# Patient Record
Sex: FEMALE | Race: ASIAN | NOT HISPANIC OR LATINO | Employment: FULL TIME | URBAN - METROPOLITAN AREA
[De-identification: names, ages, dates, MRNs, and addresses within clinical notes are randomized per-mention and may not be internally consistent; named-entity substitution may affect disease eponyms.]

---

## 2023-07-25 ENCOUNTER — OFFICE VISIT (OUTPATIENT)
Dept: OBGYN CLINIC | Facility: CLINIC | Age: 38
End: 2023-07-25
Payer: MEDICARE

## 2023-07-25 VITALS
SYSTOLIC BLOOD PRESSURE: 116 MMHG | DIASTOLIC BLOOD PRESSURE: 72 MMHG | BODY MASS INDEX: 32.57 KG/M2 | HEIGHT: 62 IN | WEIGHT: 177 LBS

## 2023-07-25 DIAGNOSIS — Z01.419 WELL WOMAN EXAM WITH ROUTINE GYNECOLOGICAL EXAM: Primary | ICD-10-CM

## 2023-07-25 DIAGNOSIS — Z12.4 SCREENING FOR MALIGNANT NEOPLASM OF CERVIX: ICD-10-CM

## 2023-07-25 DIAGNOSIS — Z11.51 SCREENING FOR HUMAN PAPILLOMAVIRUS (HPV): ICD-10-CM

## 2023-07-25 DIAGNOSIS — R20.0 BILATERAL HAND NUMBNESS: ICD-10-CM

## 2023-07-25 PROCEDURE — G0145 SCR C/V CYTO,THINLAYER,RESCR: HCPCS | Performed by: OBSTETRICS & GYNECOLOGY

## 2023-07-25 PROCEDURE — G0476 HPV COMBO ASSAY CA SCREEN: HCPCS | Performed by: OBSTETRICS & GYNECOLOGY

## 2023-07-25 PROCEDURE — 99385 PREV VISIT NEW AGE 18-39: CPT | Performed by: OBSTETRICS & GYNECOLOGY

## 2023-07-25 NOTE — PROGRESS NOTES
Ramses LeoESSMENT & PLAN: Jose Arevalo is a 41 y.o. [id number] with normal gynecologic exam.    1.  Routine well woman exam done today. 2.   Pap and HPV:Pap with HPV was done today. Current ASCCP Guidelines reviewed. Last Pap  [unfilled] :  no abnormalities. 3.  AUB- may be secondary to nexplanon. Offered to add ocp if irregular pattern continues. Also offered nexplanon removal, which she declines. 4. The patient is sexually active. 5. The following were reviewed in today's visit: breast self exam and family planning. 6. Patient to return to office in 12 months for WWE. 7.  Symptoms c/w b/l carpel tunnel. Rx placed for wrist splints, discussed. Advised follow up with PCP. All questions have been answered to her satisfaction. CC:  Annual Gynecologic Examination    HPI: Jose Arevalo is a 41 y.o. [id number] who presents for annual gynecologic examination. She has the following concerns:  Feeling of numbness in both hands. Does work as a . Pt. Has nexplanon since , reports irregular bleeding. States cycles have been more irregular recently. Hx of c/s x 2. Health Maintenance:    She wears her seatbelt routinely. She does perform irregular monthly self breast exams. She feels safe at home. Past Medical History:   Diagnosis Date   • Irregular menses        Past Surgical History:   Procedure Laterality Date   •  SECTION      2 times       Past OB/Gyn History:  Period Duration (Days): 3-5  Period Pattern: (!) Irregular  Menstrual Flow: Moderate  Menstrual Control: Maxi pad, TamponPatient's last menstrual period was 2023 (exact date). Menstrual History:  OB History        2    Para   2    Term   2            AB        Living   2       SAB        IAB        Ectopic        Multiple        Live Births   2                  Patient's last menstrual period was 2023 (exact date).   Period Duration (Days): 3-5  Period Pattern: (!) Irregular  Menstrual Flow: Moderate  Menstrual Control: Maxi pad, Tampon    History of sexually transmitted infection No  Patient is currently sexually active. heterosexual Birth control: Nexplanon since 2021. Last Pap  [unfilled] :  no abnormalities. Family History   Problem Relation Age of Onset   • Hypertension Mother    • No Known Problems Father    • No Known Problems Sister    • No Known Problems Sister    • No Known Problems Sister    • No Known Problems Brother    • No Known Problems Maternal Grandmother    • No Known Problems Maternal Grandfather    • No Known Problems Paternal Grandmother    • No Known Problems Paternal Grandfather        Social History:  Social History     Socioeconomic History   • Marital status: Single     Spouse name: Not on file   • Number of children: Not on file   • Years of education: Not on file   • Highest education level: Not on file   Occupational History   • Not on file   Tobacco Use   • Smoking status: Never   • Smokeless tobacco: Never   Substance and Sexual Activity   • Alcohol use: Never   • Drug use: Never   • Sexual activity: Yes     Partners: Male     Birth control/protection: Implant   Other Topics Concern   • Not on file   Social History Narrative   • Not on file     Social Determinants of Health     Financial Resource Strain: Not on file   Food Insecurity: Not on file   Transportation Needs: Not on file   Physical Activity: Not on file   Stress: Not on file   Social Connections: Not on file   Intimate Partner Violence: Not on file   Housing Stability: Not on file     Presently lives with partner and kids. Patient is co-habitating. Patient is currently employed   No Known Allergies    Current Outpatient Medications:   •  etonogestrel (NEXPLANON) subdermal implant, 68 mg by Subdermal route once, Disp: , Rfl:     Review of Systems:  Review of Systems   Constitutional: Negative for activity change, chills, fever and unexpected weight change.    HENT: Negative for congestion, ear pain, hearing loss and sore throat. Respiratory: Negative for cough, chest tightness and shortness of breath. Cardiovascular: Negative for chest pain and leg swelling. Gastrointestinal: Negative for abdominal pain, constipation, diarrhea, nausea and vomiting. Genitourinary: Positive for menstrual problem. Negative for difficulty urinating, dysuria, frequency, pelvic pain, vaginal discharge and vaginal pain. Skin: Negative for color change and rash. Neurological: Positive for numbness. Negative for dizziness and headaches. Per HPI    Psychiatric/Behavioral: Negative for agitation and confusion. Physical Exam:  /72 (BP Location: Right arm, Patient Position: Sitting, Cuff Size: Standard)   Ht 5' 2" (1.575 m)   Wt 80.3 kg (177 lb)   LMP 07/22/2023 (Exact Date)   BMI 32.37 kg/m²    Physical Exam  Constitutional:       General: She is not in acute distress. Appearance: Normal appearance. She is obese. Genitourinary:      Vulva, bladder, rectum and urethral meatus normal.      No lesions in the vagina. Right Labia: No rash, tenderness or lesions. Left Labia: No tenderness, lesions or rash. No labial fusion noted. No vaginal discharge or tenderness. No vaginal prolapse present. No vaginal atrophy present. Right Adnexa: not tender, not full and no mass present. Left Adnexa: not tender, not full and no mass present. No cervical motion tenderness or friability. Uterus is not enlarged or prolapsed. Breasts:     Breasts are soft. Right: No inverted nipple, mass, nipple discharge or skin change. Left: No inverted nipple, mass, nipple discharge or skin change. HENT:      Head: Normocephalic and atraumatic. Nose: Nose normal.   Eyes:      Conjunctiva/sclera: Conjunctivae normal.      Pupils: Pupils are equal, round, and reactive to light. Cardiovascular:      Rate and Rhythm: Normal rate and regular rhythm.       Pulses: Normal pulses. Heart sounds: Normal heart sounds. Pulmonary:      Effort: Pulmonary effort is normal. No respiratory distress. Breath sounds: Normal breath sounds. No wheezing. Abdominal:      General: Abdomen is flat. There is no distension. Palpations: Abdomen is soft. Tenderness: There is no abdominal tenderness. There is no guarding. Musculoskeletal:         General: Normal range of motion. Cervical back: Normal range of motion and neck supple. Neurological:      General: No focal deficit present. Mental Status: She is alert and oriented to person, place, and time. Mental status is at baseline. Skin:     General: Skin is warm and dry. Psychiatric:         Mood and Affect: Mood normal.         Behavior: Behavior normal.         Thought Content: Thought content normal.         Judgment: Judgment normal.   Vitals and nursing note reviewed.

## 2023-07-27 LAB
HPV HR 12 DNA CVX QL NAA+PROBE: NEGATIVE
HPV16 DNA CVX QL NAA+PROBE: NEGATIVE
HPV18 DNA CVX QL NAA+PROBE: NEGATIVE

## 2023-08-01 LAB
LAB AP GYN PRIMARY INTERPRETATION: NORMAL
Lab: NORMAL
PATH INTERP SPEC-IMP: NORMAL

## 2024-12-14 ENCOUNTER — OFFICE VISIT (OUTPATIENT)
Dept: URGENT CARE | Facility: CLINIC | Age: 39
End: 2024-12-14
Payer: COMMERCIAL

## 2024-12-14 VITALS
DIASTOLIC BLOOD PRESSURE: 68 MMHG | BODY MASS INDEX: 32.94 KG/M2 | HEIGHT: 62 IN | SYSTOLIC BLOOD PRESSURE: 143 MMHG | RESPIRATION RATE: 14 BRPM | TEMPERATURE: 98.2 F | WEIGHT: 179 LBS | HEART RATE: 67 BPM | OXYGEN SATURATION: 99 %

## 2024-12-14 DIAGNOSIS — J20.9 ACUTE BRONCHITIS, UNSPECIFIED ORGANISM: Primary | ICD-10-CM

## 2024-12-14 PROCEDURE — 99213 OFFICE O/P EST LOW 20 MIN: CPT | Performed by: NURSE PRACTITIONER

## 2024-12-14 RX ORDER — AZITHROMYCIN 250 MG/1
TABLET, FILM COATED ORAL
Qty: 6 TABLET | Refills: 0 | Status: SHIPPED | OUTPATIENT
Start: 2024-12-14 | End: 2024-12-19

## 2024-12-14 RX ORDER — BENZONATATE 200 MG/1
200 CAPSULE ORAL 3 TIMES DAILY PRN
Qty: 20 CAPSULE | Refills: 0 | Status: SHIPPED | OUTPATIENT
Start: 2024-12-14

## 2024-12-14 NOTE — PROGRESS NOTES
[facility]        NAME: Hank Mathews is a 39 y.o. female  : 1985    MRN: [id number]  DATE: 2024  TIME: 8:48 AM    Assessment and Plan   Acute bronchitis, unspecified organism [J20.9]  1. Acute bronchitis, unspecified organism  azithromycin (ZITHROMAX) 250 mg tablet    benzonatate (TESSALON) 200 MG capsule            Patient Instructions     Patient Instructions   --Rest, drink plenty of fluids  --Fill and start antibiotic if no improvement/worsening cough over the next 3-5 days.   --Consider vitamin C, zinc, quercetin, and vitamin D to help strengthen your immune system  --For cough, you can take an OTC expectorant such as plain Robitussion or Mucinex (active ingredient guaifenesin).  A spoonful of honey at bedtime may also be helpful, as may a prescription cough medicine.  Also recommended is the use of a cool mist humidifier (with or without Vicks) in the bedroom at night.   --For sore throat, you can take OTC lozenges, use warm gargles (salt water or apple cider vinegar and honey), herbal teas, or an OTC throat spray (Chloraseptic).  --For nasal/sinus congestion, helpful measures include steam, warm compresses, an OTC saline nasal spray or Neti pot, or an OTC decongestant (such as Sudafed).  The decongestant should be avoided, however, if you are under 6 years of age, or have a history of high blood pressure or heart disease.  In addition, an OTC nasal steroid (Flonase, Nasocort) or nasal decongestant (Afrin, Calderon-synephrine) may be taken.  The nasal steroid should be used at bedtime, after the saline nasal spray. The nasal decongestant should not be taken more than 3 days consecutively in order to prevent rebound congestion.   --For nasal drainage, postnasal drip, sneezing and itching, an OTC antihistamine (Allegra, Benadryl, etc) can be taken.   --You can take Tylenol or Motrin/Advil as needed for fever, headache, body aches. Motrin/Advil should be avoided, however, if you have  a history of heart disease, bleeding ulcers, or if you take blood thinners.   --You should contact your primary care provider and/or go to the ER if your symptoms are not improved or get worse over the next 7 days.  This includes new onset fever, localized ear pain, sinus pain, as well as worsening cough, chest pain, shortness of breath, or significant weakness/fatigue.          If tests have been performed at [facility], our office will contact you with results if changes need to be made to the care plan discussed with you at the visit.  [facility]    Chief Complaint     Chief Complaint   Patient presents with    Cough     URI s/s with cough and congestion x 1 week.          History of Present Illness       Here with complaints of cough productive of yellow sputum, rhinorrhea x 2 weeks.   Some headaches. No unusual body aches. No sore throat.   No post nasal drip.    Cough ongoing--no better.   No associated wheezing, CP, dyspnea.   No GI complaints.    Taking Dayquil.    No COVID concerns.    Denies pregnancy/breastfeeding.          Review of Systems   Review of Systems   Constitutional:  Negative for fever.   HENT:  Positive for rhinorrhea. Negative for ear pain, sinus pain and sore throat.    Respiratory:  Positive for cough. Negative for shortness of breath and wheezing.    Cardiovascular:  Negative for chest pain.   Gastrointestinal:  Negative for abdominal pain and vomiting.   Musculoskeletal:  Negative for myalgias.   Neurological:  Positive for headaches.         Current Medications       Current Outpatient Medications:     azithromycin (ZITHROMAX) 250 mg tablet, Take 2 tablets today then 1 tablet daily x 4 days, Disp: 6 tablet, Rfl: 0    benzonatate (TESSALON) 200 MG capsule, Take 1 capsule (200 mg total) by mouth 3 (three) times a day as needed for cough, Disp: 20 capsule, Rfl: 0    etonogestrel (NEXPLANON) subdermal implant, 68 mg by Subdermal route once, Disp:  ", Rfl:     Current Allergies     Allergies as of 2024    (No Known Allergies)            The following portions of the patient's history were reviewed and updated as appropriate: allergies, current medications, past family history, past medical history, past social history, past surgical history and problem list.     Past Medical History:   Diagnosis Date    Irregular menses        Past Surgical History:   Procedure Laterality Date     SECTION      2 times       Family History   Problem Relation Age of Onset    Hypertension Mother     No Known Problems Father     No Known Problems Sister     No Known Problems Sister     No Known Problems Sister     No Known Problems Brother     No Known Problems Maternal Grandmother     No Known Problems Maternal Grandfather     No Known Problems Paternal Grandmother     No Known Problems Paternal Grandfather          Medications have been verified.        Objective   /68   Pulse 67   Temp 98.2 °F (36.8 °C)   Resp 14   Ht 5' 2\" (1.575 m)   Wt 81.2 kg (179 lb)   SpO2 99%   BMI 32.74 kg/m²   No LMP recorded.       Physical Exam     Physical Exam  Constitutional:       General: She is not in acute distress.     Appearance: Normal appearance. She is well-developed. She is not ill-appearing, toxic-appearing or diaphoretic.   HENT:      Head: Normocephalic.      Right Ear: Tympanic membrane, ear canal and external ear normal.      Left Ear: Tympanic membrane, ear canal and external ear normal.      Nose: Congestion and rhinorrhea present.      Comments: No sinus tenderness.      Mouth/Throat:      Pharynx: No oropharyngeal exudate or posterior oropharyngeal erythema.   Eyes:      General:         Right eye: No discharge.         Left eye: No discharge.   Cardiovascular:      Rate and Rhythm: Normal rate and regular rhythm.      Heart sounds: Normal heart sounds. No murmur heard.  Pulmonary:      Effort: Pulmonary effort is normal. No respiratory distress.      " Breath sounds: Normal breath sounds. No stridor. No wheezing, rhonchi or rales.      Comments: Breathing easy.  RR=18. No cough noted at this time.    Chest:      Chest wall: No tenderness.   Abdominal:      Tenderness: There is no abdominal tenderness.   Musculoskeletal:      Cervical back: Neck supple.   Lymphadenopathy:      Cervical: No cervical adenopathy.   Skin:     General: Skin is warm and dry.   Neurological:      Mental Status: She is alert and oriented to person, place, and time.      Deep Tendon Reflexes: Reflexes are normal and symmetric.   Psychiatric:         Mood and Affect: Mood normal.

## 2024-12-14 NOTE — PATIENT INSTRUCTIONS
--Rest, drink plenty of fluids  --Fill and start antibiotic if no improvement/worsening cough over the next 3-5 days.   --Consider vitamin C, zinc, quercetin, and vitamin D to help strengthen your immune system  --For cough, you can take an OTC expectorant such as plain Robitussion or Mucinex (active ingredient guaifenesin).  A spoonful of honey at bedtime may also be helpful, as may a prescription cough medicine.  Also recommended is the use of a cool mist humidifier (with or without Vicks) in the bedroom at night.   --For sore throat, you can take OTC lozenges, use warm gargles (salt water or apple cider vinegar and honey), herbal teas, or an OTC throat spray (Chloraseptic).  --For nasal/sinus congestion, helpful measures include steam, warm compresses, an OTC saline nasal spray or Neti pot, or an OTC decongestant (such as Sudafed).  The decongestant should be avoided, however, if you are under 6 years of age, or have a history of high blood pressure or heart disease.  In addition, an OTC nasal steroid (Flonase, Nasocort) or nasal decongestant (Afrin, Calderon-synephrine) may be taken.  The nasal steroid should be used at bedtime, after the saline nasal spray. The nasal decongestant should not be taken more than 3 days consecutively in order to prevent rebound congestion.   --For nasal drainage, postnasal drip, sneezing and itching, an OTC antihistamine (Allegra, Benadryl, etc) can be taken.   --You can take Tylenol or Motrin/Advil as needed for fever, headache, body aches. Motrin/Advil should be avoided, however, if you have a history of heart disease, bleeding ulcers, or if you take blood thinners.   --You should contact your primary care provider and/or go to the ER if your symptoms are not improved or get worse over the next 7 days.  This includes new onset fever, localized ear pain, sinus pain, as well as worsening cough, chest pain, shortness of breath, or significant weakness/fatigue.

## 2025-05-22 ENCOUNTER — OFFICE VISIT (OUTPATIENT)
Dept: URGENT CARE | Facility: CLINIC | Age: 40
End: 2025-05-22
Payer: COMMERCIAL

## 2025-05-22 VITALS
WEIGHT: 179 LBS | BODY MASS INDEX: 32.94 KG/M2 | HEIGHT: 62 IN | DIASTOLIC BLOOD PRESSURE: 82 MMHG | OXYGEN SATURATION: 99 % | SYSTOLIC BLOOD PRESSURE: 171 MMHG | RESPIRATION RATE: 16 BRPM | HEART RATE: 57 BPM | TEMPERATURE: 97.5 F

## 2025-05-22 DIAGNOSIS — L24.7 IRRITANT CONTACT DERMATITIS DUE TO PLANTS, EXCEPT FOOD: Primary | ICD-10-CM

## 2025-05-22 PROCEDURE — 99213 OFFICE O/P EST LOW 20 MIN: CPT | Performed by: NURSE PRACTITIONER

## 2025-05-22 RX ORDER — PREDNISONE 20 MG/1
TABLET ORAL
Qty: 15 TABLET | Refills: 0 | Status: SHIPPED | OUTPATIENT
Start: 2025-05-22 | End: 2025-06-03

## 2025-05-22 NOTE — PROGRESS NOTES
Name: Hank Mathews      : 1985      MRN: [id number]  Encounter Provider: ALFRED Reese  Encounter Date: 2025   Encounter department: [facility]  :  Assessment & Plan  Irritant contact dermatitis due to plants, except food  Begin oral steroids as prescribed  May use an antihistamine cream such benadryl cream to help with the itching  Avoid known allergens  Applying a cool, wet cloth to affected area for 15-20 minutes several times a day may help reduce itching and inflammation  Avoid scratching area to prevent infection  Wash and dry hands thoroughly to prevent spread    If symptoms worsen or signs of infection develop such as swelling, warmth, redness, red streaks, purulent drainage or fever, go to the emergency dept for further evaluation   Orders:    predniSONE 20 mg tablet; Take 2 tablets (40 mg total) by mouth daily for 3 days, THEN 1.5 tablets (30 mg total) daily for 3 days, THEN 1 tablet (20 mg total) daily for 3 days, THEN 0.5 tablets (10 mg total) daily for 3 days.        History of Present Illness   Rash  Pertinent negatives include no cough, fever, shortness of breath, sore throat or vomiting.     Hank Mathews is a 39 y.o. female who presents for evaluation of a rash.  Patient reports she was out working in some brush on Saturday and noticed a rash on her right forearm 2 days ago which then spread to her right abdomen and left neck.  She states she has been putting hydrocortisone cream on the rash with no improvement she has also been taking p.o. Benadryl for symptoms.  She states the rash is itchy and seems to be spreading.  She denies fever.  She denies difficulty swallowing, shortness of breath, or swelling of her oral mucosa.      Review of Systems   Constitutional:  Negative for chills and fever.   HENT:  Negative for ear pain and sore throat.    Eyes:  Negative for pain and visual disturbance.   Respiratory:  Negative for cough and shortness of  "breath.    Cardiovascular:  Negative for chest pain and palpitations.   Gastrointestinal:  Negative for abdominal pain and vomiting.   Genitourinary:  Negative for dysuria and hematuria.   Musculoskeletal:  Negative for arthralgias and back pain.   Skin:  Positive for rash. Negative for color change.        Right arm, right abdomen and left neck   Neurological:  Negative for seizures and syncope.   All other systems reviewed and are negative.         Objective   BP (!) 171/82   Pulse 57   Temp 97.5 °F (36.4 °C)   Resp 16   Ht 5' 2\" (1.575 m)   Wt 81.2 kg (179 lb)   SpO2 99%   BMI 32.74 kg/m²      Physical Exam  Vitals and nursing note reviewed.   Constitutional:       General: She is not in acute distress.     Appearance: She is well-developed.   HENT:      Head: Normocephalic and atraumatic.      Nose: Nose normal.      Mouth/Throat:      Mouth: Mucous membranes are moist.      Pharynx: Oropharynx is clear.     Eyes:      Conjunctiva/sclera: Conjunctivae normal.       Cardiovascular:      Rate and Rhythm: Normal rate and regular rhythm.      Heart sounds: No murmur heard.  Pulmonary:      Effort: Pulmonary effort is normal. No respiratory distress.      Breath sounds: Normal breath sounds.   Abdominal:      Palpations: Abdomen is soft.      Tenderness: There is no abdominal tenderness.     Musculoskeletal:         General: No swelling.      Cervical back: Neck supple.     Skin:     General: Skin is warm and dry.      Capillary Refill: Capillary refill takes less than 2 seconds.      Findings: Rash present.          Neurological:      Mental Status: She is alert.     Psychiatric:         Mood and Affect: Mood normal.           "

## 2025-05-22 NOTE — PATIENT INSTRUCTIONS
Begin oral steroids as prescribed  May use an antihistamine cream such benadryl cream to help with the itching  Avoid known allergens  Applying a cool, wet cloth to affected area for 15-20 minutes several times a day may help reduce itching and inflammation  Avoid scratching area to prevent infection  Wash and dry hands thoroughly to prevent spread    If symptoms worsen or signs of infection develop such as swelling, warmth, redness, red streaks, purulent drainage or fever, go to the emergency dept for further evaluation

## 2025-06-05 ENCOUNTER — OFFICE VISIT (OUTPATIENT)
Dept: URGENT CARE | Facility: CLINIC | Age: 40
End: 2025-06-05
Payer: COMMERCIAL

## 2025-06-05 VITALS
HEART RATE: 77 BPM | RESPIRATION RATE: 16 BRPM | OXYGEN SATURATION: 98 % | TEMPERATURE: 98 F | DIASTOLIC BLOOD PRESSURE: 80 MMHG | SYSTOLIC BLOOD PRESSURE: 127 MMHG

## 2025-06-05 DIAGNOSIS — L23.9 ALLERGIC CONTACT DERMATITIS, UNSPECIFIED TRIGGER: Primary | ICD-10-CM

## 2025-06-05 PROCEDURE — 96372 THER/PROPH/DIAG INJ SC/IM: CPT | Performed by: NURSE PRACTITIONER

## 2025-06-05 PROCEDURE — 99213 OFFICE O/P EST LOW 20 MIN: CPT | Performed by: NURSE PRACTITIONER

## 2025-06-05 RX ORDER — FLUOCINONIDE 0.5 MG/G
CREAM TOPICAL 2 TIMES DAILY
Qty: 60 G | Refills: 0 | Status: SHIPPED | OUTPATIENT
Start: 2025-06-05

## 2025-06-05 RX ORDER — METHYLPREDNISOLONE ACETATE 40 MG/ML
80 INJECTION, SUSPENSION INTRA-ARTICULAR; INTRALESIONAL; INTRAMUSCULAR; SOFT TISSUE ONCE
Status: COMPLETED | OUTPATIENT
Start: 2025-06-05 | End: 2025-06-05

## 2025-06-05 RX ORDER — PREDNISONE 10 MG/1
TABLET ORAL
Qty: 36 TABLET | Refills: 0 | Status: SHIPPED | OUTPATIENT
Start: 2025-06-05

## 2025-06-05 RX ADMIN — METHYLPREDNISOLONE ACETATE 80 MG: 40 INJECTION, SUSPENSION INTRA-ARTICULAR; INTRALESIONAL; INTRAMUSCULAR; SOFT TISSUE at 18:11

## 2025-06-05 NOTE — PROGRESS NOTES
[facility]        NAME: Hank Mathews is a 40 y.o. female  : 1985    MRN: [id number]  DATE: 2025  TIME: 6:09 PM    Assessment and Plan   Allergic contact dermatitis, unspecified trigger [L23.9]  1. Allergic contact dermatitis, unspecified trigger  methylPREDNISolone acetate (DEPO-MEDROL) injection 80 mg    predniSONE 10 mg tablet    fluocinonide (LIDEX) 0.05 % cream        --Denies pregnancy  --Scabies unlikely    Patient Instructions     Patient Instructions   -- Injectable steroid given should start working in 30-60 minutes and lasts for 12 to 24 hours.  Starting tomorrow can fill and start Rx oral steroid (prednisone) taking as directed for any ongoing symptoms  --Rx topical steroid applied sparingly to affected areas.  Avoid applying around eyes  --Avoid known triggers  --Consider also over-the-counter Sarna lotion  --Follow-up with PCP if no resolution over the next week.  Go to ER if worsening.     If tests have been performed at [facility], our office will contact you with results if changes need to be made to the care plan discussed with you at the visit.  [facility]    Chief Complaint     Chief Complaint   Patient presents with    Rash     Pt reports generalized rash over entire body, was seen on  and given steroid with minimal improvement, reports still itchy.          History of Present Illness       Here with complaints of very itchy, generalized rash.  Started on right forearm on  after working outside in yard.  Think she came in contact with a plant or something in the brush.  Seen here on  for same complaints.  At this time she was prescribed prednisone taper starting at 40 mg.  She has completed this.  States that symptoms initially improved but then as she decreased dose of prednisone and stopped it then the rash has gotten worse.  Has continued to spread on both arms and legs as well as portion of trunk and portion  "of face.  Remains itchy day and night.  Some small \"blisters\" noted with some initial areas of weeping.  No relief from over-the-counter hydrocortisone or Benadryl.  Denies household contacts with symptoms.  Denies recent change in soaps, lotions, detergents, medications.  Denies prior skin conditions.  Denies pregnancy.        Review of Systems   Review of Systems   Constitutional:  Negative for fever.   Skin:  Positive for rash.         Current Medications     Current Medications[1]    Current Allergies     Allergies as of 06/05/2025    (No Known Allergies)            The following portions of the patient's history were reviewed and updated as appropriate: allergies, current medications, past family history, past medical history, past social history, past surgical history and problem list.     Past Medical History[2]    Past Surgical History[3]    Family History[4]      Medications have been verified.        Objective   /80   Pulse 77   Temp 98 °F (36.7 °C)   Resp 16   SpO2 98%   No LMP recorded.       Physical Exam     Physical Exam  Pulmonary:      Effort: Pulmonary effort is normal.     Skin:     Findings: Erythema and rash present.      Comments: Right greater than left upper arm, lower legs, portion of trunk, lower portion of face with scattered, faintly erythematous, linear and maculopapular lesions and plaques.  Few vesicles noted.  No active weeping.     Neurological:      Mental Status: She is alert.     Psychiatric:         Mood and Affect: Mood normal.                        [1]   Current Outpatient Medications:     fluocinonide (LIDEX) 0.05 % cream, Apply topically 2 (two) times a day, Disp: 60 g, Rfl: 0    predniSONE 10 mg tablet, TAKE 6 TAB PO DAILY X 1 DAY, THEN 5 TAB DAILY X 2 DAYS, THEN 4 TAB DAILY X 2 DAYS, THEN 3 TAB DAILY X 2 DAYS, THEN 2 TAB DAILY X 2 DAYS, THEN 1 TAB DAILY X 2 DAYS, Disp: 36 tablet, Rfl: 0    benzonatate (TESSALON) 200 MG capsule, Take 1 capsule (200 mg total) by " mouth 3 (three) times a day as needed for cough (Patient not taking: Reported on 2025), Disp: 20 capsule, Rfl: 0    etonogestrel (NEXPLANON) subdermal implant, 68 mg by Subdermal route once, Disp: , Rfl:     Current Facility-Administered Medications:     methylPREDNISolone acetate (DEPO-MEDROL) injection 80 mg, 80 mg, Intramuscular, Once,   [2]   Past Medical History:  Diagnosis Date    Irregular menses    [3]   Past Surgical History:  Procedure Laterality Date     SECTION      2 times   [4]   Family History  Problem Relation Name Age of Onset    Hypertension Mother      No Known Problems Father      No Known Problems Sister      No Known Problems Sister      No Known Problems Sister      No Known Problems Brother      No Known Problems Maternal Grandmother      No Known Problems Maternal Grandfather      No Known Problems Paternal Grandmother      No Known Problems Paternal Grandfather

## 2025-06-05 NOTE — PATIENT INSTRUCTIONS
-- Injectable steroid given should start working in 30-60 minutes and lasts for 12 to 24 hours.  Starting tomorrow can fill and start Rx oral steroid (prednisone) taking as directed for any ongoing symptoms  --Rx topical steroid applied sparingly to affected areas.  Avoid applying around eyes  --Avoid known triggers  --Consider also over-the-counter Sarna lotion  --Follow-up with PCP if no resolution over the next week.  Go to ER if worsening.